# Patient Record
Sex: MALE | Race: WHITE | NOT HISPANIC OR LATINO | URBAN - METROPOLITAN AREA
[De-identification: names, ages, dates, MRNs, and addresses within clinical notes are randomized per-mention and may not be internally consistent; named-entity substitution may affect disease eponyms.]

---

## 2024-02-06 ENCOUNTER — EMERGENCY (EMERGENCY)
Facility: HOSPITAL | Age: 29
LOS: 0 days | Discharge: HOME | End: 2024-02-06
Attending: EMERGENCY MEDICINE
Payer: MEDICAID

## 2024-02-06 VITALS
SYSTOLIC BLOOD PRESSURE: 138 MMHG | HEART RATE: 113 BPM | WEIGHT: 80.03 LBS | OXYGEN SATURATION: 98 % | RESPIRATION RATE: 18 BRPM | DIASTOLIC BLOOD PRESSURE: 86 MMHG | TEMPERATURE: 100 F

## 2024-02-06 DIAGNOSIS — H92.01 OTALGIA, RIGHT EAR: ICD-10-CM

## 2024-02-06 DIAGNOSIS — H66.91 OTITIS MEDIA, UNSPECIFIED, RIGHT EAR: ICD-10-CM

## 2024-02-06 PROCEDURE — 99284 EMERGENCY DEPT VISIT MOD MDM: CPT | Mod: 25

## 2024-02-06 PROCEDURE — 99283 EMERGENCY DEPT VISIT LOW MDM: CPT

## 2024-02-06 NOTE — ED ADULT TRIAGE NOTE - WEIGHT IN LBS
WBC 6.7 4.2 - 9.4 K/uL    RBC 4.32 3.93 - 4.90 M/uL    Hemoglobin 13.0 10.8 - 13.3 g/dL    Hematocrit 37.2 33.4 - 40.4 %    MCV 86.1 76.9 - 90.6 FL    MCH 30.1 24.8 - 30.2 PG    MCHC 34.9 (H) 31.5 - 34.2 g/dL    RDW 13.1 12.3 - 14.6 %    Platelets 257 194 - 345 K/uL    MPV 10.0 9.6 - 11.7 FL    Nucleated RBCs 0.0 0  WBC    nRBC 0.00 (L) 0.03 - 0.13 K/uL    Neutrophils % 74 39 - 74 %    Lymphocytes % 22 18 - 50 %    Monocytes % 4 4 - 11 %    Eosinophils % 0 0 - 3 %    Basophils % 0 0 - 1 %    Immature Granulocytes 0 0.0 - 0.3 %    Neutrophils Absolute 4.9 1.8 - 7.5 K/UL    Lymphocytes Absolute 1.4 1.2 - 3.3 K/UL    Monocytes Absolute 0.3 0.2 - 0.7 K/UL    Eosinophils Absolute 0.0 0.0 - 0.3 K/UL    Basophils Absolute 0.0 0.0 - 0.1 K/UL    Absolute Immature Granulocyte 0.0 0.00 - 0.03 K/UL    Differential Type AUTOMATED     Comprehensive Metabolic Panel    Collection Time: 01/22/24 11:49 PM   Result Value Ref Range    Sodium 140 132 - 141 mmol/L    Potassium 3.8 3.5 - 5.1 mmol/L    Chloride 111 (H) 97 - 108 mmol/L    CO2 19 18 - 29 mmol/L    Anion Gap 10 5 - 15 mmol/L    Glucose 84 54 - 117 mg/dL    BUN 15 6 - 20 MG/DL    Creatinine 0.67 0.30 - 0.90 MG/DL    Bun/Cre Ratio 22 (H) 12 - 20      Est, Glom Filt Rate Cannot be calculated >60 ml/min/1.73m2    Calcium 9.5 8.8 - 10.8 MG/DL    Total Bilirubin 0.7 0.2 - 1.0 MG/DL    ALT 18 12 - 78 U/L    AST 15 10 - 30 U/L    Alk Phosphatase 171 90 - 340 U/L    Total Protein 7.7 6.0 - 8.0 g/dL    Albumin 4.6 3.2 - 5.5 g/dL    Globulin 3.1 2.0 - 4.0 g/dL    Albumin/Globulin Ratio 1.5 1.1 - 2.2     Acetaminophen Level    Collection Time: 01/22/24 11:49 PM   Result Value Ref Range    Acetaminophen Level 10 10 - 30 ug/mL   COVID-19 & Influenza Combo    Collection Time: 01/23/24 12:23 AM    Specimen: Nasopharyngeal   Result Value Ref Range    SARS-CoV-2, PCR Not detected NOTD      Rapid Influenza A By PCR Not detected NOTD      Rapid Influenza B By PCR Not detected NOTD   
80

## 2024-02-06 NOTE — ED PROVIDER NOTE - NSFOLLOWUPINSTRUCTIONS_ED_ALL_ED_FT
Ear Infection in Children    WHAT YOU NEED TO KNOW:    An ear infection is also called otitis media. Your child may have an ear infection in one or both ears. Your child may get an ear infection when his or her eustachian tubes become swollen or blocked. Eustachian tubes drain fluid away from the middle ear. Your child may have a buildup of fluid and pressure in his or her ear when he or she has an ear infection. The ear may become infected by germs. The germs grow easily in fluid trapped behind the eardrum.Ear Anatomy         DISCHARGE INSTRUCTIONS:    Return to the emergency department if:     You see blood or pus draining from your child's ear.      Your child seems confused or cannot stay awake.      Your child has a stiff neck, headache, and a fever.    Contact your child's healthcare provider if:     Your child has a fever.      Your child is still not eating or drinking 24 hours after he or she takes medicine.      Your child has pain behind his or her ear or when you move the earlobe.      Your child's ear is sticking out from his or her head.      Your child still has signs and symptoms of an ear infection 48 hours after he or she takes medicine.      You have questions or concerns about your child's condition or care.    Medicines:     Medicines may be given to decrease your child's pain or fever, or to treat an infection caused by bacteria.       Do not give aspirin to children under 18 years of age. Your child could develop Reye syndrome if he takes aspirin. Reye syndrome can cause life-threatening brain and liver damage. Check your child's medicine labels for aspirin, salicylates, or oil of wintergreen.       Give your child's medicine as directed. Contact your child's healthcare provider if you think the medicine is not working as expected. Tell him or her if your child is allergic to any medicine. Keep a current list of the medicines, vitamins, and herbs your child takes. Include the amounts, and when, how, and why they are taken. Bring the list or the medicines in their containers to follow-up visits. Carry your child's medicine list with you in case of an emergency.    Care for your child at home:     Prop your older child's head and chest up while he or she sleeps. This may decrease ear pressure and pain. Ask your child's healthcare provider how to safely prop your child's head and chest up.      Have your child lie with his or her infected ear facing down to allow fluid to drain from the ear.       Use ice or heat to help decrease your child's ear pain. Ask which of these is best for your child, and use as directed.      Ask about ways to keep water out of your child's ears when he or she bathes or swims.     Prevent an ear infection:     Wash your and your child's hands often to help prevent the spread of germs. Ask everyone in your house to wash their hands with soap and water. Ask them to wash after they use the bathroom or change a diaper. Remind them to wash before they prepare or eat food.Handwashing           Keep your child away from people who are ill, such as sick playmates. Germs spread easily and quickly in  centers.       If possible, breastfeed your baby. Your baby may be less likely to get an ear infection if he or she is .      Do not give your child a bottle while he or she is lying down. This may cause liquid from the sinuses to leak into his or her eustachian tube.      Keep your child away from people who smoke.       Vaccinate your child. Ask your child's healthcare provider about the shots your child needs.    Follow up with your child's healthcare provider as directed: Write down your questions so you remember to ask them during your child's visits.       © Copyright [employer] 2019 All illustrations and images included in [employer]Notes are the copyrighted property of [employer] or [employer]

## 2024-02-06 NOTE — ED PROVIDER NOTE - PATIENT PORTAL LINK FT
You can access the FollowMyHealth Patient Portal offered by [facility] by registering at the following website: [email or web address]. By joining [employer]’s FollowMyHealth portal, you will also be able to view your health information using other applications (apps) compatible with our system.

## 2024-02-06 NOTE — ED PROVIDER NOTE - PHYSICAL EXAMINATION
VITAL SIGNS: I have reviewed nursing notes and confirm.  CONSTITUTIONAL: well-appearing, non-toxic, NAD  SKIN: Warm dry, normal skin turgor  HEAD: NCAT  EYES: EOMI, PERRLA, no scleral icterus  ENT: Moist mucous membranes, normal pharynx with no erythema or exudates. (+) Right TM erythematous. Normal left TM.  NECK: Supple; non tender. Full ROM. No cervical LAD  CARD: RRR, no murmurs, rubs or gallops  RESP: clear to ausculation b/l.  No rales, rhonchi, or wheezing.  ABD: soft, non-tender  EXT: Full ROM  NEURO: Normal gait.  PSYCH: Cooperative, appropriate.

## 2024-02-06 NOTE — ED PROVIDER NOTE - OBJECTIVE STATEMENT
Patient is a 28-year-old male no past medical history presenting for evaluation of right ear pain for the last 3 days.  Patient notes that he was diagnosed with the flu 1 week ago and recovered from flu symptoms.  Patient has been taking Motrin and TheraFlu with minimal relief of ear pain.  Patient tried eardrops (ear pain MD) 2 days ago with no relief.  Patient has had tactile fevers but did not measure his temperature.  No chest pain, shortness of breath, abdominal pain, nausea, vomiting.

## 2024-12-06 ENCOUNTER — EMERGENCY (EMERGENCY)
Facility: HOSPITAL | Age: 29
LOS: 0 days | Discharge: HOME | End: 2024-12-06
Attending: EMERGENCY MEDICINE
Payer: MEDICAID

## 2024-12-06 VITALS
SYSTOLIC BLOOD PRESSURE: 132 MMHG | HEART RATE: 107 BPM | TEMPERATURE: 100 F | RESPIRATION RATE: 18 BRPM | DIASTOLIC BLOOD PRESSURE: 87 MMHG | WEIGHT: 176.37 LBS | HEIGHT: 68 IN | OXYGEN SATURATION: 99 %

## 2024-12-06 DIAGNOSIS — H66.91 OTITIS MEDIA, UNSPECIFIED, RIGHT EAR: ICD-10-CM

## 2024-12-06 DIAGNOSIS — R09.81 NASAL CONGESTION: ICD-10-CM

## 2024-12-06 DIAGNOSIS — H92.01 OTALGIA, RIGHT EAR: ICD-10-CM

## 2024-12-06 DIAGNOSIS — R05.1 ACUTE COUGH: ICD-10-CM

## 2024-12-06 PROCEDURE — 99283 EMERGENCY DEPT VISIT LOW MDM: CPT

## 2024-12-06 RX ORDER — FLUTICASONE PROPIONATE 50 MCG
1 SPRAY, SUSPENSION NASAL
Qty: 1 | Refills: 0
Start: 2024-12-06 | End: 2024-12-10

## 2024-12-06 RX ORDER — AMOXICILLIN 250 MG
1 CAPSULE ORAL
Qty: 10 | Refills: 0
Start: 2024-12-06 | End: 2024-12-10

## 2024-12-06 NOTE — ED PROVIDER NOTE - OBJECTIVE STATEMENT
29-year-old male no past medical history presents with right ear pain starting today.  Patient reports associated nasal congestion X 3 days.  Denies fever.  Denies headache, sore throat, cough, chest pain, shortness of breath.  Patient states symptoms feel similar to past ear infections and came to ED.

## 2024-12-06 NOTE — ED PROVIDER NOTE - PATIENT PORTAL LINK FT
You can access the FollowMyHealth Patient Portal offered by [facility] by registering at the following website: [email or web address]. By joining [employer]’s FollowMyHealth portal, you will also be able to view your health information using other applications (apps) compatible with our system.

## 2024-12-06 NOTE — ED PROVIDER NOTE - PHYSICAL EXAMINATION
Vital Signs: I have reviewed the initial vital signs.  CONSTITUTIONAL: Pt in no acute distress  SKIN: Skin exam is warm and dry, no acute rash.  HEAD: Normocephalic; atraumatic.  EYES: PERRL, EOM intact; conjunctiva and sclera clear.  ENT: No nasal discharge; airway clear. TMs +right TM hyperemic with mild bulging. No mastoid tenderness. Left TM normal.   NECK: Supple; FROM  MSK: Normal ROM. No clubbing, cyanosis or edema.

## 2024-12-06 NOTE — ED PROVIDER NOTE - ATTENDING APP SHARED VISIT CONTRIBUTION OF CARE
Right ear pain in the setting of congestion and cough.  Pain started 1 day ago.  No discharge from the ear no trauma.  On exam the right TM is bulging and erythematous.  Plan will be antibiotics for otitis media

## 2024-12-07 PROBLEM — Z78.9 OTHER SPECIFIED HEALTH STATUS: Chronic | Status: ACTIVE | Noted: 2024-02-06
